# Patient Record
Sex: FEMALE | Race: WHITE | URBAN - METROPOLITAN AREA
[De-identification: names, ages, dates, MRNs, and addresses within clinical notes are randomized per-mention and may not be internally consistent; named-entity substitution may affect disease eponyms.]

---

## 2019-06-27 ENCOUNTER — EMERGENCY (EMERGENCY)
Facility: HOSPITAL | Age: 24
LOS: 1 days | Discharge: HOME | End: 2019-06-27
Attending: EMERGENCY MEDICINE | Admitting: EMERGENCY MEDICINE
Payer: COMMERCIAL

## 2019-06-27 VITALS
DIASTOLIC BLOOD PRESSURE: 70 MMHG | RESPIRATION RATE: 18 BRPM | HEART RATE: 66 BPM | OXYGEN SATURATION: 96 % | TEMPERATURE: 98 F | SYSTOLIC BLOOD PRESSURE: 120 MMHG

## 2019-06-27 DIAGNOSIS — J45.901 UNSPECIFIED ASTHMA WITH (ACUTE) EXACERBATION: ICD-10-CM

## 2019-06-27 PROCEDURE — 99284 EMERGENCY DEPT VISIT MOD MDM: CPT | Mod: 25

## 2019-06-27 NOTE — ED ADULT TRIAGE NOTE - CHIEF COMPLAINT QUOTE
Pt complaining of asthma exacerbation. PT states that she feels like something is sitting on her chest. PT states that she does not have an inhaler at this time. Pt speaking in full sentences.

## 2019-06-28 VITALS
RESPIRATION RATE: 16 BRPM | OXYGEN SATURATION: 98 % | TEMPERATURE: 98 F | SYSTOLIC BLOOD PRESSURE: 118 MMHG | DIASTOLIC BLOOD PRESSURE: 78 MMHG | HEART RATE: 76 BPM

## 2019-06-28 RX ORDER — ALBUTEROL 90 UG/1
1 AEROSOL, METERED ORAL ONCE
Refills: 0 | Status: COMPLETED | OUTPATIENT
Start: 2019-06-28 | End: 2019-06-28

## 2019-06-28 RX ORDER — ALBUTEROL 90 UG/1
2.5 AEROSOL, METERED ORAL ONCE
Refills: 0 | Status: COMPLETED | OUTPATIENT
Start: 2019-06-28 | End: 2019-06-28

## 2019-06-28 RX ORDER — ALBUTEROL 90 UG/1
2 AEROSOL, METERED ORAL
Qty: 1 | Refills: 0
Start: 2019-06-28 | End: 2019-06-29

## 2019-06-28 RX ADMIN — ALBUTEROL 2.5 MILLIGRAM(S): 90 AEROSOL, METERED ORAL at 01:20

## 2019-06-28 RX ADMIN — Medication 40 MILLIGRAM(S): at 01:20

## 2019-06-28 RX ADMIN — ALBUTEROL 1 PUFF(S): 90 AEROSOL, METERED ORAL at 01:20

## 2019-06-28 NOTE — ED PROVIDER NOTE - OBJECTIVE STATEMENT
asthma, her pump , her doctor called in another and steroids but she can't get them til tomorrow, better after nebs and steroids in ED, never intubated

## 2019-06-28 NOTE — ED ADULT NURSE NOTE - CHPI ED NUR SYMPTOMS NEG
no fever/no cough/no chest pain/no diaphoresis/no hemoptysis/no chills/no body aches/no edema/no headache

## 2019-06-28 NOTE — ED PROVIDER NOTE - NSFOLLOWUPINSTRUCTIONS_ED_ALL_ED_FT
Please take prednisone 20mg every 12 hours for three days and albuterol every four hours for the next 24 hours then as needed.

## 2019-06-28 NOTE — ED PROVIDER NOTE - CARE PROVIDER_API CALL
Silvia Oleary)  Allergy and Immunology; Pediatric Pulmonary Medicine; Pediatrics  [address] [address]  Phone: [phone]  Fax: [phone]  Follow Up Time:

## 2019-06-28 NOTE — ED ADULT NURSE NOTE - NSIMPLEMENTINTERV_GEN_ALL_ED
Implemented All Universal Safety Interventions:  [place] to call system. Call bell, personal items and telephone within reach. Instruct patient to call for assistance. Room bathroom lighting operational. Non-slip footwear when patient is off stretcher. Physically safe environment: no spills, clutter or unnecessary equipment. Stretcher in lowest position, wheels locked, appropriate side rails in place.

## 2019-08-14 PROBLEM — J45.909 UNSPECIFIED ASTHMA, UNCOMPLICATED: Chronic | Status: ACTIVE | Noted: 2019-06-28

## 2019-08-21 PROBLEM — Z00.00 ENCOUNTER FOR PREVENTIVE HEALTH EXAMINATION: Status: ACTIVE | Noted: 2019-08-21

## 2019-09-06 ENCOUNTER — APPOINTMENT (OUTPATIENT)
Dept: PEDIATRIC ALLERGY IMMUNOLOGY | Facility: CLINIC | Age: 24
End: 2019-09-06
Payer: COMMERCIAL

## 2019-09-06 VITALS
TEMPERATURE: 98.2 F | HEART RATE: 66 BPM | OXYGEN SATURATION: 98 % | SYSTOLIC BLOOD PRESSURE: 109 MMHG | HEIGHT: 64 IN | WEIGHT: 124 LBS | BODY MASS INDEX: 21.17 KG/M2 | DIASTOLIC BLOOD PRESSURE: 72 MMHG

## 2019-09-06 DIAGNOSIS — Z82.5 FAMILY HISTORY OF ASTHMA AND OTHER CHRONIC LOWER RESPIRATORY DISEASES: ICD-10-CM

## 2019-09-06 DIAGNOSIS — Z83.6 FAMILY HISTORY OF OTHER DISEASES OF THE RESPIRATORY SYSTEM: ICD-10-CM

## 2019-09-06 DIAGNOSIS — J30.81 ALLERGIC RHINITIS DUE TO ANIMAL (CAT) (DOG) HAIR AND DANDER: ICD-10-CM

## 2019-09-06 DIAGNOSIS — J45.20 MILD INTERMITTENT ASTHMA, UNCOMPLICATED: ICD-10-CM

## 2019-09-06 DIAGNOSIS — J30.1 ALLERGIC RHINITIS DUE TO POLLEN: ICD-10-CM

## 2019-09-06 PROCEDURE — 99204 OFFICE O/P NEW MOD 45 MIN: CPT | Mod: 25

## 2019-09-06 PROCEDURE — 94060 EVALUATION OF WHEEZING: CPT

## 2019-09-06 PROCEDURE — 95004 PERQ TESTS W/ALRGNC XTRCS: CPT

## 2019-09-06 RX ORDER — CETIRIZINE HYDROCHLORIDE 10 MG/1
10 TABLET, COATED ORAL
Qty: 1 | Refills: 5 | Status: ACTIVE | COMMUNITY
Start: 2019-09-06 | End: 1900-01-01

## 2019-09-06 RX ORDER — FLUTICASONE PROPIONATE 50 UG/1
50 SPRAY, METERED NASAL DAILY
Qty: 1 | Refills: 5 | Status: ACTIVE | COMMUNITY
Start: 2019-09-06 | End: 1900-01-01

## 2019-09-06 RX ORDER — FLUTICASONE PROPIONATE 44 UG/1
44 AEROSOL, METERED RESPIRATORY (INHALATION)
Qty: 1 | Refills: 5 | Status: ACTIVE | COMMUNITY
Start: 2019-09-06 | End: 1900-01-01

## 2019-09-06 RX ORDER — ALBUTEROL SULFATE 90 UG/1
108 (90 BASE) AEROSOL, METERED RESPIRATORY (INHALATION)
Qty: 1 | Refills: 5 | Status: ACTIVE | COMMUNITY
Start: 2019-09-06 | End: 1900-01-01

## 2019-09-06 NOTE — REVIEW OF SYSTEMS
[Post Nasal Drip] : post nasal drip [Nl] : Genitourinary [Immunizations are up to date] : Immunizations are up to date [Received Influenza Vaccine this Past Year] : patient has received the Influenza vaccine this past year [FreeTextEntry4] : scratchy throat

## 2019-09-06 NOTE — PHYSICAL EXAM
[Alert] : alert [Well Nourished] : well nourished [Healthy Appearance] : healthy appearance [Well Developed] : well developed [No Acute Distress] : no acute distress [Normal Pupil & Iris Size/Symmetry] : normal pupil and iris size and symmetry [No Discharge] : no discharge [No Photophobia] : no photophobia [Sclera Not Icteric] : sclera not icteric [Suborbital Bogginess] : suborbital bogginess (allergic shiners) [Normal Nasal Mucosa] : the nasal mucosa was normal [Normal TMs] : both tympanic membranes were normal [Normal Lips/Tongue] : the lips and tongue were normal [Normal Outer Ear/Nose] : the ears and nose were normal in appearance [Normal Tonsils] : normal tonsils [No Thrush] : no thrush [Normal Dentition] : normal dentition [No Oral Lesions or Ulcers] : no oral lesions or ulcers [Boggy Nasal Turbinates] : boggy and/or pale nasal turbinates [Normal Rate and Effort] : normal respiratory rhythm and effort [Supple] : the neck was supple [Normal Palpation] : palpation of the chest revealed no abnormalities [No Crackles] : no crackles [No Retractions] : no retractions [Bilateral Audible Breath Sounds] : bilateral audible breath sounds [Normal Rate] : heart rate was normal  [Normal S1, S2] : normal S1 and S2 [No murmur] : no murmur [Regular Rhythm] : with a regular rhythm [Not Tender] : non-tender [Soft] : abdomen soft [Not Distended] : not distended [Normal Cervical Lymph Nodes] : cervical [No HSM] : no hepato-splenomegaly [Normal Axillary Lumph Nodes] : axillary [Skin Intact] : skin intact  [No Rash] : no rash [No Skin Lesions] : no skin lesions [No Joint Swelling or Erythema] : no joint swelling or erythema [No clubbing] : no clubbing [No Edema] : no edema [No Cyanosis] : no cyanosis [Normal Mood] : mood was normal [Normal Affect] : affect was normal [Alert, Awake, Oriented as Age-Appropriate] : alert, awake, oriented as age appropriate [Pharyngeal erythema] : no pharyngeal erythema [Eczematous Patches] : no eczematous patches [Posterior Pharyngeal Cobblestoning] : no posterior pharyngeal cobblestoning [Wheezing] : no wheezing was heard

## 2019-09-06 NOTE — IMPRESSION
[Spirometry] : Spirometry [Normal Spirometry] : spirometry normal [Allergy Testing Cat] : cat [Allergy Testing Dog] : dog [Allergy Testing Trees] : trees [Allergy Testing Grasses] : grasses [Allergy Testing Dust Mite] : dust mites [Allergy Testing Cockroach] : cockroach [Allergy Testing Mixed Feathers] : feathers [] : molds [Allergy Testing Weeds] : weeds [________] : [unfilled] [Reversible] : , without reversibility.

## 2019-09-06 NOTE — SOCIAL HISTORY
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

## 2019-09-06 NOTE — HISTORY OF PRESENT ILLNESS
[de-identified] : Amy is a 24 year old woman with asthma and allergies who is here for initial evaluation. \par \par She has had asthma for the past 6 or  7 years.  June 28th she had an episode in which she could not breathe.  Throughout the prior week she felt heaviness of her chest, like she could not breathe. Took a taxi to the ED where she received albuterol nebs and steroids. Completed ~1 week course of oral steroids and returned to baseline.\par Triggers include poor air quality, dust, heat, humidity, pollen, cats, grass, as well as colds.\par She has lingering cough with colds. \par No nocturnal apart from colds or increased allergy symptoms.\par She has taken Qvar in the past but never for more than a few weeks. \par When she travels to areas with dry heat (eg. [place]) she feels much better. Worse in cities that have smog or humidity.\par No admissions/ICU admissions.\par Her half brother has asthma and was in the ICU x 1 week when he was younger. He is 10 and still has asthma.  Her half sister has allergies.\par Her father has many allergies.\par Mother is not atopic. \par Currently has Ventolin. She gets SOB with exercise. Uses 3 times a day in the summer when it is hot and humid. In July she was using it every day. She has not been using a spacer. \par She has had fewer symptoms recently as the weather has been changing.\par \par Allergic rhinitis: Symptoms include nasal congestion, rhinorrhea, sneezing, post-nasal drip, ocular pruritus, nasal pruritus, watery eyes, snoring, and mouth breathing. Throat clearing.\par Symptoms are present all year long. Cats cause itchy throat. \par Medications include Benadryl PRN as well as allegra PRN.\par She had allergy testing several years ago that was reportedly positive to pollen, cats, maybe dust.\par \par Very dry hands - eczema when she was younger. Her brother and sister have eczema.\par \par Hx of ovarian cysts. On and off OCPs.\par \par Food allergy: No suspicion for food allergy.  Tolerates milk, eggs, wheat, soy, peanut, tree nut, fish and shellfish.  Currently is vegan so no longer eats dairy or eggs.\par \par Recently had a knee injury; she thinks she has a torn meniscus and may need knee surgery at [facility].\par PMD - Dr. Jose David Burr\par \par \par \par \par \par

## 2019-09-06 NOTE — CONSULT LETTER
[Consult Letter:] : I had the pleasure of evaluating your patient, [unfilled]. [Dear  ___] : Dear  [unfilled], [Please see my note below.] : Please see my note below. [Consult Closing:] : Thank you very much for allowing me to participate in the care of this patient.  If you have any questions, please do not hesitate to contact me. [Sincerely,] : Sincerely, [DrSpenser  ___] : Dr. MELGAR [FreeTextEntry3] : Silvia Oleary MD\par Attending Physician \par Division of Allergy/Immunology \par [facility] \par \par  of Medicine and Pediatrics\par [facility] \par \par [address]\[place] [address]\par Tel: [phone]\par Fax: [phone]\par Email: [email or web address]\par \par \par \par

## 2019-09-13 ENCOUNTER — APPOINTMENT (OUTPATIENT)
Dept: PEDIATRIC ALLERGY IMMUNOLOGY | Facility: CLINIC | Age: 24
End: 2019-09-13